# Patient Record
Sex: MALE | Race: WHITE | NOT HISPANIC OR LATINO | Employment: FULL TIME | URBAN - METROPOLITAN AREA
[De-identification: names, ages, dates, MRNs, and addresses within clinical notes are randomized per-mention and may not be internally consistent; named-entity substitution may affect disease eponyms.]

---

## 2023-05-20 ENCOUNTER — HOSPITAL ENCOUNTER (EMERGENCY)
Facility: CLINIC | Age: 43
Discharge: HOME | End: 2023-05-21
Attending: EMERGENCY MEDICINE | Admitting: EMERGENCY MEDICINE
Payer: COMMERCIAL

## 2023-05-20 DIAGNOSIS — R07.89 CHEST WALL PAIN: ICD-10-CM

## 2023-05-20 DIAGNOSIS — I26.99 OTHER ACUTE PULMONARY EMBOLISM, UNSPECIFIED WHETHER ACUTE COR PULMONALE PRESENT (H): ICD-10-CM

## 2023-05-20 PROCEDURE — 99285 EMERGENCY DEPT VISIT HI MDM: CPT | Mod: 25

## 2023-05-20 PROCEDURE — 96374 THER/PROPH/DIAG INJ IV PUSH: CPT | Mod: 59

## 2023-05-20 PROCEDURE — 93005 ELECTROCARDIOGRAM TRACING: CPT

## 2023-05-20 RX ORDER — FENTANYL CITRATE 50 UG/ML
50 INJECTION, SOLUTION INTRAMUSCULAR; INTRAVENOUS ONCE
Status: COMPLETED | OUTPATIENT
Start: 2023-05-20 | End: 2023-05-21

## 2023-05-21 ENCOUNTER — APPOINTMENT (OUTPATIENT)
Dept: CT IMAGING | Facility: CLINIC | Age: 43
End: 2023-05-21
Attending: EMERGENCY MEDICINE
Payer: COMMERCIAL

## 2023-05-21 VITALS
OXYGEN SATURATION: 94 % | SYSTOLIC BLOOD PRESSURE: 135 MMHG | HEIGHT: 69 IN | DIASTOLIC BLOOD PRESSURE: 90 MMHG | TEMPERATURE: 98.4 F | BODY MASS INDEX: 30.36 KG/M2 | WEIGHT: 205 LBS | HEART RATE: 86 BPM | RESPIRATION RATE: 20 BRPM

## 2023-05-21 LAB
ANION GAP SERPL CALCULATED.3IONS-SCNC: 12 MMOL/L (ref 7–15)
ATRIAL RATE - MUSE: 104 BPM
BASOPHILS # BLD AUTO: 0 10E3/UL (ref 0–0.2)
BASOPHILS NFR BLD AUTO: 0 %
BUN SERPL-MCNC: 12.5 MG/DL (ref 6–20)
CALCIUM SERPL-MCNC: 9.6 MG/DL (ref 8.6–10)
CHLORIDE SERPL-SCNC: 98 MMOL/L (ref 98–107)
CREAT SERPL-MCNC: 0.8 MG/DL (ref 0.67–1.17)
DEPRECATED HCO3 PLAS-SCNC: 28 MMOL/L (ref 22–29)
DIASTOLIC BLOOD PRESSURE - MUSE: NORMAL MMHG
EOSINOPHIL # BLD AUTO: 0 10E3/UL (ref 0–0.7)
EOSINOPHIL NFR BLD AUTO: 0 %
ERYTHROCYTE [DISTWIDTH] IN BLOOD BY AUTOMATED COUNT: 12.1 % (ref 10–15)
GFR SERPL CREATININE-BSD FRML MDRD: >90 ML/MIN/1.73M2
GLUCOSE SERPL-MCNC: 109 MG/DL (ref 70–99)
HCT VFR BLD AUTO: 35.2 % (ref 40–53)
HGB BLD-MCNC: 12.1 G/DL (ref 13.3–17.7)
HOLD SPECIMEN: NORMAL
HOLD SPECIMEN: NORMAL
IMM GRANULOCYTES # BLD: 0 10E3/UL
IMM GRANULOCYTES NFR BLD: 0 %
INTERPRETATION ECG - MUSE: NORMAL
LYMPHOCYTES # BLD AUTO: 0.9 10E3/UL (ref 0.8–5.3)
LYMPHOCYTES NFR BLD AUTO: 9 %
MCH RBC QN AUTO: 31 PG (ref 26.5–33)
MCHC RBC AUTO-ENTMCNC: 34.4 G/DL (ref 31.5–36.5)
MCV RBC AUTO: 90 FL (ref 78–100)
MONOCYTES # BLD AUTO: 1 10E3/UL (ref 0–1.3)
MONOCYTES NFR BLD AUTO: 10 %
NEUTROPHILS # BLD AUTO: 8.1 10E3/UL (ref 1.6–8.3)
NEUTROPHILS NFR BLD AUTO: 81 %
NRBC # BLD AUTO: 0 10E3/UL
NRBC BLD AUTO-RTO: 0 /100
NT-PROBNP SERPL-MCNC: 86 PG/ML (ref 0–450)
P AXIS - MUSE: 26 DEGREES
PLATELET # BLD AUTO: 229 10E3/UL (ref 150–450)
POTASSIUM SERPL-SCNC: 4.9 MMOL/L (ref 3.4–5.3)
PR INTERVAL - MUSE: 128 MS
QRS DURATION - MUSE: 74 MS
QT - MUSE: 334 MS
QTC - MUSE: 439 MS
R AXIS - MUSE: 10 DEGREES
RBC # BLD AUTO: 3.9 10E6/UL (ref 4.4–5.9)
SODIUM SERPL-SCNC: 138 MMOL/L (ref 136–145)
SYSTOLIC BLOOD PRESSURE - MUSE: NORMAL MMHG
T AXIS - MUSE: 24 DEGREES
TROPONIN T SERPL HS-MCNC: <6 NG/L
VENTRICULAR RATE- MUSE: 104 BPM
WBC # BLD AUTO: 10.1 10E3/UL (ref 4–11)

## 2023-05-21 PROCEDURE — 250N000011 HC RX IP 250 OP 636: Performed by: EMERGENCY MEDICINE

## 2023-05-21 PROCEDURE — 80048 BASIC METABOLIC PNL TOTAL CA: CPT | Performed by: EMERGENCY MEDICINE

## 2023-05-21 PROCEDURE — 84484 ASSAY OF TROPONIN QUANT: CPT | Performed by: EMERGENCY MEDICINE

## 2023-05-21 PROCEDURE — 83880 ASSAY OF NATRIURETIC PEPTIDE: CPT | Performed by: EMERGENCY MEDICINE

## 2023-05-21 PROCEDURE — 36415 COLL VENOUS BLD VENIPUNCTURE: CPT | Performed by: EMERGENCY MEDICINE

## 2023-05-21 PROCEDURE — 71275 CT ANGIOGRAPHY CHEST: CPT

## 2023-05-21 PROCEDURE — 85025 COMPLETE CBC W/AUTO DIFF WBC: CPT | Performed by: EMERGENCY MEDICINE

## 2023-05-21 PROCEDURE — 96374 THER/PROPH/DIAG INJ IV PUSH: CPT | Mod: 59

## 2023-05-21 PROCEDURE — 250N000013 HC RX MED GY IP 250 OP 250 PS 637: Performed by: EMERGENCY MEDICINE

## 2023-05-21 PROCEDURE — 250N000009 HC RX 250: Performed by: EMERGENCY MEDICINE

## 2023-05-21 RX ORDER — DOXYCYCLINE 100 MG/1
100 CAPSULE ORAL 2 TIMES DAILY
Qty: 14 CAPSULE | Refills: 0 | Status: SHIPPED | OUTPATIENT
Start: 2023-05-21 | End: 2023-05-28

## 2023-05-21 RX ORDER — ASPIRIN 81 MG
100 TABLET, DELAYED RELEASE (ENTERIC COATED) ORAL DAILY
Qty: 10 TABLET | Refills: 0 | Status: SHIPPED | OUTPATIENT
Start: 2023-05-21

## 2023-05-21 RX ORDER — OXYCODONE HYDROCHLORIDE 5 MG/1
5 TABLET ORAL EVERY 6 HOURS PRN
Qty: 12 TABLET | Refills: 0 | Status: SHIPPED | OUTPATIENT
Start: 2023-05-21 | End: 2023-05-24

## 2023-05-21 RX ORDER — IOPAMIDOL 755 MG/ML
74 INJECTION, SOLUTION INTRAVASCULAR ONCE
Status: COMPLETED | OUTPATIENT
Start: 2023-05-21 | End: 2023-05-21

## 2023-05-21 RX ORDER — HYDROCODONE BITARTRATE AND ACETAMINOPHEN 5; 325 MG/1; MG/1
1 TABLET ORAL ONCE
Status: COMPLETED | OUTPATIENT
Start: 2023-05-21 | End: 2023-05-21

## 2023-05-21 RX ADMIN — HYDROCODONE BITARTRATE AND ACETAMINOPHEN 1 TABLET: 5; 325 TABLET ORAL at 01:44

## 2023-05-21 RX ADMIN — IOPAMIDOL 74 ML: 755 INJECTION, SOLUTION INTRAVENOUS at 00:53

## 2023-05-21 RX ADMIN — FENTANYL CITRATE 50 MCG: 50 INJECTION, SOLUTION INTRAMUSCULAR; INTRAVENOUS at 00:35

## 2023-05-21 RX ADMIN — SODIUM CHLORIDE 97 ML: 9 INJECTION, SOLUTION INTRAVENOUS at 00:53

## 2023-05-21 RX ADMIN — RIVAROXABAN 15 MG: 15 TABLET, FILM COATED ORAL at 01:44

## 2023-05-21 ASSESSMENT — ACTIVITIES OF DAILY LIVING (ADL): ADLS_ACUITY_SCORE: 35

## 2023-05-21 NOTE — ED PROVIDER NOTES
"  History     Chief Complaint:  Rib Pain       HPI   Memo Tillman is a 43 year old male presenting with chest wall pain amongst other symptoms.  Patient reports roughly 2 weeks ago he sustained a right MCL sprain.  He has been having challenges with mobility and using crutches and has been increasingly immobile per his wife.  He presents today as for the past day he describes having increasing left rib pain as well as shoulder pain that seems to worsen with deep inspiration.  Wife notes that he appears more winded with ambulation.  No reported fever, cough, chest pain, nausea, vomiting, abdominal pain.  No history of cancer, blood clots, recent surgeries.  He tried taking Tylenol for pain control with minimal relief.      Independent Historian:   Spouse/Partner - They report increased dyspnea with ambulation    Review of External Notes: 5/19/23 office visit ortho - MCL sprain       Medications:    Albuterol inhaler  Zithromax     Past Medical History:    Unilateral inguinal hernia  Migraine     Past Surgical History:    Right hernia repair  Appendectomy  Left inguinal hernia repair     Physical Exam     Patient Vitals for the past 24 hrs:   BP Temp Temp src Pulse Resp SpO2 Height Weight   05/20/23 2305 (!) 142/85 98.4  F (36.9  C) Oral 105 20 98 % 1.753 m (5' 9\") 93 kg (205 lb)        Physical Exam  Nursing note and vitals reviewed.  Constitutional: Well nourished.  Eyes: Conjunctiva normal.  Pupils are equal, round, and reactive to light.   ENT: Nose normal. Mucous membranes pink and moist.    Neck: Normal range of motion.  CVS: Sinus tachycardia.  Normal heart sounds.    Pulmonary: Lungs clear to auscultation bilaterally. No wheezes/rales/rhonchi.  GI: Abdomen soft. Nontender, nondistended. No rigidity or guarding.    MSK: No calf tenderness or swelling. RLE in knee immobilizer; no overlying warmth/erythema to lower extremity  Neuro: Alert. Follows simple commands.  Skin: Skin is warm and dry. No rash noted. "   Psychiatric: Normal affect.       Emergency Department Course   ECG  ECG obtained at 2310, ECG read at 2317  Sinus tachycardia   Minimal voltage criteria for LVH, may be normal variant (R in aVL)  Borderline ECG  Rate 104 bpm. IN interval 128 ms. QRS duration 74 ms. QT/QTc 334/439 ms. P-R-T axes 26 10 24.    Imaging:  CT Chest Pulmonary Embolism w Contrast   Final Result   IMPRESSION:   1.  Bilateral pulmonary emboli.   2.  Infiltrate within lingula and left lower lobe. Pulmonary infarct not excluded.      3.  Results were called to Dr. Felisha Guajardo at 0115         Report per radiology    Laboratory:  Labs Ordered and Resulted from Time of ED Arrival to Time of ED Departure   BASIC METABOLIC PANEL - Abnormal       Result Value    Sodium 138      Potassium 4.9      Chloride 98      Carbon Dioxide (CO2) 28      Anion Gap 12      Urea Nitrogen 12.5      Creatinine 0.80      Calcium 9.6      Glucose 109 (*)     GFR Estimate >90     CBC WITH PLATELETS AND DIFFERENTIAL - Abnormal    WBC Count 10.1      RBC Count 3.90 (*)     Hemoglobin 12.1 (*)     Hematocrit 35.2 (*)     MCV 90      MCH 31.0      MCHC 34.4      RDW 12.1      Platelet Count 229      % Neutrophils 81      % Lymphocytes 9      % Monocytes 10      % Eosinophils 0      % Basophils 0      % Immature Granulocytes 0      NRBCs per 100 WBC 0      Absolute Neutrophils 8.1      Absolute Lymphocytes 0.9      Absolute Monocytes 1.0      Absolute Eosinophils 0.0      Absolute Basophils 0.0      Absolute Immature Granulocytes 0.0      Absolute NRBCs 0.0     TROPONIN T, HIGH SENSITIVITY - Normal    Troponin T, High Sensitivity <6     NT PROBNP INPATIENT - Normal    N terminal Pro BNP Inpatient 86          Procedures   none    Emergency Department Course & Assessments:       Interventions:  Medications   fentaNYL (PF) (SUBLIMAZE) injection 50 mcg (50 mcg Intravenous $Given 5/21/23 0035)   iopamidol (ISOVUE-370) solution 74 mL (74 mLs Intravenous $Given 5/21/23  0053)   Saline (97 mLs Intravenous $Given 5/21/23 0053)   rivaroxaban ANTICOAGULANT (XARELTO) tablet 15 mg (15 mg Oral $Given 5/21/23 0144)   HYDROcodone-acetaminophen (NORCO) 5-325 MG per tablet 1 tablet (1 tablet Oral $Given 5/21/23 0144)        Consultations/Discussion of Management or Tests:  Discussed CT imaging with radiology Dr. Connors    Social Determinants of Health affecting care:   None    Disposition:  The patient was discharged to home.     Impression & Plan    PESI Score for estimating PE Associated 30-Day Mortality (calculator)  Background  Estimates the 30-day mortality risk associated with pulmonary embolism based on 11 criteria including age, gender, cancer history, CHF, COPD, heart rate >110, SBP <100, RR >30, Tm <96.8 F, O2 Sat <90, and altered mental status.   Data  43 year old  does not have a problem list on file.  Temp: 98.4  F (36.9  C)  BP: (!) 135/90  Pulse: 86  Resp: 20  SpO2: 94 %  Criteria  Score 1/y: Age  Score 10: Male gender  Interpretation  PESI Score: 54   Score <66: Class 1 - Very low 30-day mortality risk (0 to 1.6%)          Medical Decision Making:  Patient is a 43-year-old male presenting with predominately complaints of chest wall pain.  He reports recent significant immobilization secondary to a right knee injury.  He is mildly tachycardic on arrival though overall nontoxic, in no significant distress.  Work-up today does suggest bilateral peripheral PE.  No evidence of heart strain.  CT also suggest concerns for an infiltrate within the lingula/left lower lobe.  I discussed with the patient a pulmonary infarct cannot be excluded.  EKG without STEMI.  High-sensitivity screening troponin negative.  Lower clinical suspicion for ACS at this point in time.  Based on his Pasi score, he is a very low 30-day mortality risk.  We discussed need for anticoagulation initiation and he consented to this after contraindications reviewed.  His pain was controlled during his time in the  ED.  Patient was given his first dose of Xarelto during his time in the ED.  We will plan for analgesia with oxycodone on discharge.  I also will initiate doxycycline given concern for possible developing pneumonia.  He is not hypoxic at bedside and appears stable for close PCP follow-up which she is agreeable to.  I discussed I strongly suspect his PE was precipitated by immobilization.  All questions addressed.  Return for worsening chest pain, dyspnea, fever or should symptoms worsen/change.       Diagnosis:    ICD-10-CM    1. Other acute pulmonary embolism, unspecified whether acute cor pulmonale present (H)  I26.99       2. Chest wall pain  R07.89            Discharge Medications:  Discharge Medication List as of 5/21/2023  2:10 AM      START taking these medications    Details   docusate sodium (COLACE) 100 MG tablet Take 1 tablet (100 mg) by mouth daily, Disp-10 tablet, R-0, E-Prescribe      doxycycline hyclate (VIBRAMYCIN) 100 MG capsule Take 1 capsule (100 mg) by mouth 2 times daily for 7 days, Disp-14 capsule, R-0, E-Prescribe      oxyCODONE (ROXICODONE) 5 MG tablet Take 1 tablet (5 mg) by mouth every 6 hours as needed for pain, Disp-12 tablet, R-0, E-Prescribe      Rivaroxaban ANTICOAGULANT 15 & 20 MG TBPK Starter Therapy Pack Take 15 mg by mouth 2 times daily (with meals) for 21 days, THEN 20 mg daily with food for 9 days., Disp-51 each, R-0, E-Prescribe            Scribe Disclosure:  I, Aria Barnes, am serving as a scribe at 2:26 AM on 5/21/2023 to document services personally performed by Felisha Guajardo DO based on my observations and the provider's statements to me.    5/20/2023   Felisha Guajardo DO McDonald, Lindsey E, DO  05/21/23 0244

## 2023-05-21 NOTE — ED TRIAGE NOTES
Patient here with left rib pain and back pain which started yesterday. He also c/o shortness of breath. Patient fell 2 weeks ago and injured his right knee     Triage Assessment     Row Name 05/20/23 0860       Triage Assessment (Adult)    Airway WDL WDL       Respiratory WDL    Respiratory WDL WDL       Skin Circulation/Temperature WDL    Skin Circulation/Temperature WDL WDL       Cardiac WDL    Cardiac WDL WDL       Peripheral/Neurovascular WDL    Peripheral Neurovascular WDL WDL       Cognitive/Neuro/Behavioral WDL    Cognitive/Neuro/Behavioral WDL WDL